# Patient Record
Sex: MALE | Race: WHITE | NOT HISPANIC OR LATINO | Employment: FULL TIME | URBAN - METROPOLITAN AREA
[De-identification: names, ages, dates, MRNs, and addresses within clinical notes are randomized per-mention and may not be internally consistent; named-entity substitution may affect disease eponyms.]

---

## 2022-06-06 ENCOUNTER — OFFICE VISIT (OUTPATIENT)
Dept: URGENT CARE | Facility: CLINIC | Age: 22
End: 2022-06-06
Payer: COMMERCIAL

## 2022-06-06 VITALS
HEART RATE: 69 BPM | OXYGEN SATURATION: 99 % | TEMPERATURE: 97 F | WEIGHT: 203 LBS | RESPIRATION RATE: 14 BRPM | HEIGHT: 69 IN | BODY MASS INDEX: 30.07 KG/M2

## 2022-06-06 DIAGNOSIS — B34.9 VIRAL SYNDROME: Primary | ICD-10-CM

## 2022-06-06 LAB
SARS-COV-2 AG UPPER RESP QL IA: NEGATIVE
VALID CONTROL: NORMAL

## 2022-06-06 PROCEDURE — 99213 OFFICE O/P EST LOW 20 MIN: CPT

## 2022-06-06 PROCEDURE — 87811 SARS-COV-2 COVID19 W/OPTIC: CPT

## 2022-06-06 NOTE — LETTER
June 6, 2022     Patient: Shagufta Trevizo   YOB: 2000   Date of Visit: 6/6/2022       To Whom it May Concern:    Shagufta Trevizo was seen in my clinic on 6/6/2022  He may return to work on 6/8/2022  If you have any questions or concerns, please don't hesitate to call           Sincerely,          FELIPE Calderon        CC: No Recipients

## 2022-06-06 NOTE — PATIENT INSTRUCTIONS
Your rapid COVID was negative  If your results are positive for COVID regardless of vaccination status, you are to isolate for 5 days from symptom onset then continue to wear a mask around others for another 5 days  If you have a fever, continue to stay home until your fever resolves  If you were exposed to someone with COVID and have been boosted or completed the primary series of Pfizer or Moderna vaccine within the last 6 months, or completed the primary series of J&J vaccine within the last 2 months - you are to wear a mask around others for 10 days and test on day 5 if possible  If you completed the primary series of Pfizer or Moderna vaccine over 6 months ago and are not boosted or completed the primary series of J&J over 2 months ago and are not boosted or are not vaccinated - you are to stay home for 5 days then continue to wear a mask around others for another 5 days  If you are unable to quarantine, you must wear a mask for 10 days  You should be fever free for 24 hours without the use of medication before returning to work  Tylenol and ibuprofen as needed for pain and/or fever  Mucinex DM for cough and congestion  Vitamin C 1000 mg, Vitamin D3 2000 units, and Zinc 100 mg help to boost your immunity to fight viruses  Hydrate - water and sports drinks (such as Gatorade, body armour, etc ) are great for hydration  Rest   Follow up with your PCP  Go to ED for worsening symptoms

## 2022-06-06 NOTE — PROGRESS NOTES
[facility]        NAME: Jordyn Anne is a 25 y o  male  : 2000    MRN: [id number]  DATE: 2022  TIME: 8:56 AM    Assessment and Plan   Viral syndrome [B34 9]  1  Viral syndrome  Poct Covid 19 Rapid Antigen Test     Rapid COVID negative  Patient Instructions     Your rapid COVID was negative  If your results are positive for COVID regardless of vaccination status, you are to isolate for 5 days from symptom onset then continue to wear a mask around others for another 5 days  If you have a fever, continue to stay home until your fever resolves  If you were exposed to someone with COVID and have been boosted or completed the primary series of Pfizer or Moderna vaccine within the last 6 months, or completed the primary series of J&J vaccine within the last 2 months - you are to wear a mask around others for 10 days and test on day 5 if possible  If you completed the primary series of Pfizer or Moderna vaccine over 6 months ago and are not boosted or completed the primary series of J&J over 2 months ago and are not boosted or are not vaccinated - you are to stay home for 5 days then continue to wear a mask around others for another 5 days  If you are unable to quarantine, you must wear a mask for 10 days  You should be fever free for 24 hours without the use of medication before returning to work  Tylenol and ibuprofen as needed for pain and/or fever  Mucinex DM for cough and congestion  Vitamin C 1000 mg, Vitamin D3 2000 units, and Zinc 100 mg help to boost your immunity to fight viruses  Hydrate - water and sports drinks (such as Gatorade, body armour, etc ) are great for hydration  Rest   Follow up with your PCP  Go to ED for worsening symptoms  Chief Complaint     Chief Complaint   Patient presents with    Cold Like Symptoms     C/o nasal congestion, cough, headache, chills, onset 2 days ago           History of Present Illness       Patient complaining of nasal congestion, sore throat, headache, dry cough, chills, fatigue, and fever that started 3 days ago  He denies trying alleviating factors  Patient denies significant PMH  Review of Systems   Review of Systems   Constitutional: Positive for fatigue and fever  Negative for chills  HENT: Positive for congestion and sore throat  Negative for ear pain and rhinorrhea  Respiratory: Positive for cough  Negative for shortness of breath  Cardiovascular: Negative for chest pain  Gastrointestinal: Negative for diarrhea, nausea and vomiting  Musculoskeletal: Negative for myalgias  Neurological: Positive for headaches  All other systems reviewed and are negative  Current Medications     No current outpatient medications on file  Current Allergies     Allergies as of 06/06/2022    (No Known Allergies)            The following portions of the patient's history were reviewed and updated as appropriate: allergies, current medications, past family history, past medical history, past social history, past surgical history and problem list      Past Medical History:   Diagnosis Date    Known health problems: none        Past Surgical History:   Procedure Laterality Date    NO PAST SURGERIES         Family History   Problem Relation Age of Onset    No Known Problems Mother     Diabetes Father          Medications have been verified  Objective   Pulse 69   Temp (!) 97 °F (36 1 °C)   Resp 14   Ht 5' 9" (1 753 m)   Wt 92 1 kg (203 lb)   SpO2 99%   BMI 29 98 kg/m²        Physical Exam     Physical Exam  Vitals and nursing note reviewed  Constitutional:       General: He is not in acute distress  Appearance: Normal appearance  He is not toxic-appearing  HENT:      Head: Normocephalic and atraumatic  Right Ear: External ear normal       Left Ear: External ear normal       Nose: Congestion present  No rhinorrhea  Right Turbinates: Swollen  Left Turbinates: Swollen  Right Sinus: No maxillary sinus tenderness or frontal sinus tenderness  Left Sinus: No maxillary sinus tenderness or frontal sinus tenderness  Mouth/Throat:      Mouth: Mucous membranes are moist       Pharynx: Oropharynx is clear  Posterior oropharyngeal erythema present  No oropharyngeal exudate  Eyes:      General: No scleral icterus  Right eye: No discharge  Left eye: No discharge  Conjunctiva/sclera: Conjunctivae normal    Cardiovascular:      Rate and Rhythm: Normal rate and regular rhythm  Heart sounds: Normal heart sounds  Pulmonary:      Effort: Pulmonary effort is normal       Breath sounds: Normal breath sounds  Abdominal:      Palpations: Abdomen is soft  Tenderness: There is no abdominal tenderness  Musculoskeletal:         General: Normal range of motion  Cervical back: Normal range of motion  Lymphadenopathy:      Cervical: No cervical adenopathy  Skin:     General: Skin is warm and dry  Neurological:      General: No focal deficit present  Mental Status: He is alert and oriented to person, place, and time     Psychiatric:         Mood and Affect: Mood normal          Behavior: Behavior normal

## 2022-10-03 ENCOUNTER — OFFICE VISIT (OUTPATIENT)
Dept: URGENT CARE | Facility: CLINIC | Age: 22
End: 2022-10-03
Payer: COMMERCIAL

## 2022-10-03 VITALS
DIASTOLIC BLOOD PRESSURE: 66 MMHG | HEIGHT: 69 IN | HEART RATE: 105 BPM | SYSTOLIC BLOOD PRESSURE: 134 MMHG | OXYGEN SATURATION: 99 % | TEMPERATURE: 97.9 F | BODY MASS INDEX: 30.07 KG/M2 | WEIGHT: 203 LBS | RESPIRATION RATE: 17 BRPM

## 2022-10-03 DIAGNOSIS — R68.89 FLU-LIKE SYMPTOMS: Primary | ICD-10-CM

## 2022-10-03 DIAGNOSIS — J02.9 ACUTE PHARYNGITIS, UNSPECIFIED ETIOLOGY: ICD-10-CM

## 2022-10-03 LAB
S PYO AG THROAT QL: NEGATIVE
SARS-COV-2 AG UPPER RESP QL IA: NEGATIVE
VALID CONTROL: NORMAL

## 2022-10-03 PROCEDURE — 87811 SARS-COV-2 COVID19 W/OPTIC: CPT | Performed by: PHYSICIAN ASSISTANT

## 2022-10-03 PROCEDURE — 99213 OFFICE O/P EST LOW 20 MIN: CPT | Performed by: PHYSICIAN ASSISTANT

## 2022-10-03 PROCEDURE — 87880 STREP A ASSAY W/OPTIC: CPT | Performed by: PHYSICIAN ASSISTANT

## 2022-10-03 PROCEDURE — 87070 CULTURE OTHR SPECIMN AEROBIC: CPT | Performed by: PHYSICIAN ASSISTANT

## 2022-10-03 RX ORDER — LIDOCAINE HYDROCHLORIDE 20 MG/ML
15 SOLUTION OROPHARYNGEAL 4 TIMES DAILY PRN
Qty: 100 ML | Refills: 0 | Status: SHIPPED | OUTPATIENT
Start: 2022-10-03

## 2022-10-03 RX ORDER — AMOXICILLIN 500 MG/1
500 CAPSULE ORAL EVERY 12 HOURS SCHEDULED
Qty: 20 CAPSULE | Refills: 0 | Status: SHIPPED | OUTPATIENT
Start: 2022-10-03 | End: 2022-10-13

## 2022-10-03 RX ORDER — PREDNISONE 10 MG/1
10 TABLET ORAL DAILY
Qty: 21 TABLET | Refills: 0 | Status: SHIPPED | OUTPATIENT
Start: 2022-10-03

## 2022-10-03 NOTE — PROGRESS NOTES
[facility]        NAME: Anna Mccrary is a 25 y o  male  : 2000    MRN: [id number]  DATE: October 3, 2022  TIME: 12:23 PM    Assessment and Plan   Flu-like symptoms [R68 89]  1  Flu-like symptoms  Poct Covid 19 Rapid Antigen Test    POCT rapid strepA    Throat culture   2  Acute pharyngitis, unspecified etiology  amoxicillin (AMOXIL) 500 mg capsule    predniSONE 10 mg tablet    Lidocaine Viscous HCl (XYLOCAINE) 2 % mucosal solution         Patient Instructions   Take antibiotic as prescribed  Complete full dose of antibiotics even if symptoms begin to improve or resolve  This is very contagious; do not share drinks or food with others  Replace your toothbrush in 1-2 days to prevent reinfection  Use OTC Tylenol for fever  Your symptoms should begin to improve over the next couple days  Follow up with PCP in 3-5 days  Proceed to  ER if symptoms worsen  Chief Complaint     Chief Complaint   Patient presents with    Nasal Congestion     Started with Sore throat  on Wednesday di a at home test that day He is having Pressure in head and cough Congestion          History of Present Illness       Patient is a 68-year-old male with no significant past medical history presents the office complaining of headache, congestion, rhinorrhea, sore throat, and cough for 6 days  States he did have a fever but that resolved  Reports his pain is rated 3-10 located his throat which is worse with coughing  He denies SOB, CP, nausea, vomiting, abdominal pain or rashes  Reports his symptoms are coming and going in severity  Review of Systems   Review of Systems   Constitutional: Positive for chills, fatigue and fever  HENT: Positive for congestion, postnasal drip, rhinorrhea and sore throat  Respiratory: Positive for cough  Negative for shortness of breath  Cardiovascular: Negative for chest pain and palpitations     Gastrointestinal: Negative for abdominal pain, diarrhea, nausea and vomiting  Musculoskeletal: Positive for myalgias  Skin: Negative for rash  Neurological: Positive for headaches  Negative for dizziness and light-headedness  Current Medications       Current Outpatient Medications:     amoxicillin (AMOXIL) 500 mg capsule, Take 1 capsule (500 mg total) by mouth every 12 (twelve) hours for 10 days, Disp: 20 capsule, Rfl: 0    Lidocaine Viscous HCl (XYLOCAINE) 2 % mucosal solution, Swish and spit 15 mL 4 (four) times a day as needed for mouth pain or discomfort, Disp: 100 mL, Rfl: 0    predniSONE 10 mg tablet, Take 1 tablet (10 mg total) by mouth daily Take 6 on day 1, take 5 on day 2, take 4 on day 3, take 3 on day 4, take 2 on day 5, take 1 on day 6 , Disp: 21 tablet, Rfl: 0    Current Allergies     Allergies as of 10/03/2022    (No Known Allergies)            The following portions of the patient's history were reviewed and updated as appropriate: allergies, current medications, past family history, past medical history, past social history, past surgical history and problem list      Past Medical History:   Diagnosis Date    Known health problems: none        Past Surgical History:   Procedure Laterality Date    NO PAST SURGERIES         Family History   Problem Relation Age of Onset    No Known Problems Mother     Diabetes Father          Medications have been verified  Objective   /66   Pulse 105   Temp 97 9 °F (36 6 °C)   Resp 17   Ht 5' 9" (1 753 m)   Wt 92 1 kg (203 lb)   SpO2 99%   BMI 29 98 kg/m²   No LMP for male patient  Physical Exam     Physical Exam  Vitals and nursing note reviewed  Constitutional:       Appearance: Normal appearance  He is well-developed  HENT:      Head: Normocephalic and atraumatic  Right Ear: Tympanic membrane, ear canal and external ear normal       Left Ear: Tympanic membrane, ear canal and external ear normal       Nose: Congestion and rhinorrhea present        Mouth/Throat:      Mouth: Mucous membranes are moist       Pharynx: Oropharynx is clear  Uvula midline  Posterior oropharyngeal erythema present  No pharyngeal swelling  Tonsils: Tonsillar exudate present  No tonsillar abscesses  Eyes:      General: Lids are normal       Conjunctiva/sclera: Conjunctivae normal       Pupils: Pupils are equal, round, and reactive to light  Cardiovascular:      Rate and Rhythm: Normal rate and regular rhythm  Heart sounds: Normal heart sounds  No murmur heard  No friction rub  No gallop  Pulmonary:      Effort: Pulmonary effort is normal       Breath sounds: Normal breath sounds  No stridor  No wheezing or rales  Musculoskeletal:         General: Normal range of motion  Cervical back: Neck supple  Lymphadenopathy:      Cervical: Cervical adenopathy present  Skin:     General: Skin is warm and dry  Capillary Refill: Capillary refill takes less than 2 seconds  Neurological:      Mental Status: He is alert         POC rapid COVID-19 negative    POC rapid strep negative

## 2022-10-03 NOTE — LETTER
October 3, 2022     Patient: Rasheeda Wong   YOB: 2000   Date of Visit: 10/3/2022       To Whom It May Concern: It is my medical opinion that Yo Vo should remain out of work until 10/5/2022             Sincerely,        Lala Cheung PA-C

## 2022-10-05 LAB — BACTERIA THROAT CULT: NORMAL

## 2023-08-31 ENCOUNTER — OFFICE VISIT (OUTPATIENT)
Dept: URGENT CARE | Facility: CLINIC | Age: 23
End: 2023-08-31
Payer: COMMERCIAL

## 2023-08-31 VITALS
OXYGEN SATURATION: 97 % | BODY MASS INDEX: 30.36 KG/M2 | HEIGHT: 69 IN | RESPIRATION RATE: 18 BRPM | HEART RATE: 83 BPM | TEMPERATURE: 97.7 F | DIASTOLIC BLOOD PRESSURE: 71 MMHG | SYSTOLIC BLOOD PRESSURE: 137 MMHG | WEIGHT: 205 LBS

## 2023-08-31 DIAGNOSIS — J02.9 VIRAL PHARYNGITIS: Primary | ICD-10-CM

## 2023-08-31 LAB — S PYO AG THROAT QL: NEGATIVE

## 2023-08-31 PROCEDURE — 87147 CULTURE TYPE IMMUNOLOGIC: CPT | Performed by: PHYSICIAN ASSISTANT

## 2023-08-31 PROCEDURE — 99213 OFFICE O/P EST LOW 20 MIN: CPT | Performed by: PHYSICIAN ASSISTANT

## 2023-08-31 PROCEDURE — 87070 CULTURE OTHR SPECIMN AEROBIC: CPT | Performed by: PHYSICIAN ASSISTANT

## 2023-08-31 NOTE — LETTER
August 31, 2023     Patient: Pierce Lui   YOB: 2000   Date of Visit: 8/31/2023       To Whom It May Concern:     It is my medical opinion that Emma Guzman may return to work on 9/1/2023             Sincerely,        Brennon Briscoe PA-C

## 2023-08-31 NOTE — PROGRESS NOTES
[facility]        NAME: Jessika Luciano is a 23 y.o. male  : 2000    MRN: [id number]  DATE: 2023  TIME: 1:14 PM    Assessment and Plan   Viral pharyngitis [J02.9]  1. Viral pharyngitis  POCT rapid strepA    Throat culture            Patient Instructions   Drink plenty of fluids. May use over the counter cold medications for symptomatic treatment. Do not use medications with Pseudoephedrine or Phenylphrine if you have high blood pressure because it may worsen your blood pressure. Follow up with your PCP in 3-5 days if your symptoms do not improve or if you have any concerns. Go to the ER if symptoms become severe. Follow up with PCP in 3-5 days. Proceed to  ER if symptoms worsen. Chief Complaint     Chief Complaint   Patient presents with   • URI     Fatigue, sub f/c, sore throat x 4 days         History of Present Illness       Patient is a 45-year-old male with no sniffing a past medical history presents the office planing of fatigue, subjective fevers and chills, sore throat for 4 days. Pain is rated 3 out of 10. Reports symptoms are mild in nature other than fatigue. Denies congestion, cough, abdominal symptoms, or rashes. He needs a note to be excused from work. Review of Systems   Review of Systems   Constitutional: Positive for chills and fatigue. Negative for fever. HENT: Negative for congestion and sore throat. Respiratory: Negative for cough and shortness of breath. Cardiovascular: Negative for chest pain and palpitations. Gastrointestinal: Negative for abdominal pain, diarrhea, nausea and vomiting. Musculoskeletal: Positive for myalgias. Skin: Negative for rash. Neurological: Negative for dizziness, light-headedness and headaches. Current Medications     No current outpatient medications on file.     Current Allergies     Allergies as of 2023   • (No Known Allergies)            The following portions of the patient's history were reviewed and updated as appropriate: allergies, current medications, past family history, past medical history, past social history, past surgical history and problem list.     Past Medical History:   Diagnosis Date   • Known health problems: none        Past Surgical History:   Procedure Laterality Date   • NO PAST SURGERIES         Family History   Problem Relation Age of Onset   • No Known Problems Mother    • Diabetes Father          Medications have been verified. Objective   /71   Pulse 83   Temp 97.7 °F (36.5 °C)   Resp 18   Ht 5' 9" (1.753 m)   Wt 93 kg (205 lb)   SpO2 97%   BMI 30.27 kg/m²   No LMP for male patient. Physical Exam     Physical Exam  Vitals and nursing note reviewed. Constitutional:       Appearance: Normal appearance. He is well-developed. HENT:      Head: Normocephalic and atraumatic. Right Ear: Tympanic membrane, ear canal and external ear normal.      Left Ear: Tympanic membrane, ear canal and external ear normal.      Nose: Nose normal.      Mouth/Throat:      Mouth: Mucous membranes are moist.      Pharynx: Uvula midline. Posterior oropharyngeal erythema present. No pharyngeal swelling. Tonsils: No tonsillar exudate or tonsillar abscesses. Eyes:      General: Lids are normal.      Conjunctiva/sclera: Conjunctivae normal.      Pupils: Pupils are equal, round, and reactive to light. Cardiovascular:      Rate and Rhythm: Normal rate and regular rhythm. Heart sounds: Normal heart sounds. No murmur heard. No friction rub. No gallop. Pulmonary:      Effort: Pulmonary effort is normal.      Breath sounds: Normal breath sounds. No stridor. No wheezing or rales. Musculoskeletal:         General: Normal range of motion. Cervical back: Neck supple. Skin:     General: Skin is warm and dry. Capillary Refill: Capillary refill takes less than 2 seconds. Neurological:      Mental Status: He is alert.        POC rapid strep negative

## 2023-09-03 LAB — BACTERIA THROAT CULT: ABNORMAL

## 2025-01-20 ENCOUNTER — OFFICE VISIT (OUTPATIENT)
Dept: URGENT CARE | Facility: CLINIC | Age: 25
End: 2025-01-20
Payer: COMMERCIAL

## 2025-01-20 VITALS
WEIGHT: 216 LBS | HEIGHT: 69 IN | RESPIRATION RATE: 16 BRPM | DIASTOLIC BLOOD PRESSURE: 77 MMHG | SYSTOLIC BLOOD PRESSURE: 123 MMHG | BODY MASS INDEX: 31.99 KG/M2 | HEART RATE: 83 BPM | TEMPERATURE: 96.4 F | OXYGEN SATURATION: 96 %

## 2025-01-20 DIAGNOSIS — J06.9 UPPER RESPIRATORY TRACT INFECTION, UNSPECIFIED TYPE: Primary | ICD-10-CM

## 2025-01-20 PROCEDURE — G0382 LEV 3 HOSP TYPE B ED VISIT: HCPCS

## 2025-01-20 PROCEDURE — S9083 URGENT CARE CENTER GLOBAL: HCPCS

## 2025-01-20 NOTE — LETTER
January 20, 2025     Patient: Quentin Maza   YOB: 2000   Date of Visit: 1/20/2025       To Whom it May Concern:    Quentin Maza was seen in my clinic on 1/20/2025. He was seen for an illness that began Wednesday night on 1/15/25 that left you out of work the rest of the week. He may return to work today 1/20/25.    If you have any questions or concerns, please don't hesitate to call.         Sincerely,          Mina Penn PA-C        CC: No Recipients

## 2025-01-20 NOTE — PROGRESS NOTES
[facility]        NAME: Quentin Maza is a 24 y.o. male  : 2000    MRN: [id number]  DATE: 2025  TIME: 3:24 PM    Assessment and Plan   Upper respiratory tract infection, unspecified type [J06.9]  1. Upper respiratory tract infection, unspecified type          Patient declined any need for further treatment of symptoms given most of his symptoms have resolved.  Recommended patient still drink plenty of fluids.  Patient expressed understanding.  Patient asked for a note stating he was seen and treated here today and he is able to go return to work.  Patient asked if the paperwork could include the 3 days he was off last week.  Explained to patient we are unable to complete any backtracking of days off in regards to work notes at this office.  However explained inpatient note to employer that he was seen for an illness that began on Wednesday and patient said that that was satisfactory.  I explained that this may not completely excuse you and you might need proper paperwork to your primary provider and patient expressed understanding.    Patient Instructions     Patient Instructions   Cold symptoms may last for a total of 1-2 weeks. Symptoms typically start with a sore throat and progress to include sinus pain/pressure, runny nose (yellow/green), and congestion/cough. The yellow/green color does not necessarily indicate a bacterial sinus infection. If your sinus symptoms worsen on day 10-14, you may want to consider re-evaluation for a possible secondary bacterial sinus infection. Coughs are typically most bothersome the first 1-2 weeks. Coughs frequently linger for 4-6 weeks. However, have your lungs re-evaluated if you develop sudden worsening cough, shortness or breath or chest discomfort.     Medication as prescribed    Vitamin D3 2000 IU daily  Vitamin C 1000mg twice per day  Some studies suggest that Zinc 12.5-15mg every 2 hours while awake x 5 days may shorten the duration cold  symptoms by 1-2 days.   Fluids and rest  Nasal saline spray (Extra Strength) 3 drops in each nostril 4x per day may shorten the duration of illness by 1-2 days and help prevent spread.  Afrin if severe congestion (do not use for more than 3 days)  Over the counter cold medication as needed (EX: Mucinex DM, Sudafed I.e. pseudoephedrine, tylenol)  Sinus Rinses (used distilled water only and carefully follow instructions)  Salt water gargles and chloraseptic spray  Throat Coat Tea (herbal licorice root tea for sore throat-add honey unless diabetic)  Warm compresses over sinuses  Steam treatment (utilize proper safety precautions when in contact with hot water/steam)        Follow up with PCP in 3-5 days.  Proceed to  ER if symptoms worsen.    Chief Complaint     Chief Complaint   Patient presents with    Cold Like Symptoms     Sore throat and cough starting 5 days ago.          History of Present Illness       25-year-old male presenting with cold-like symptoms x 5 days.  Patient reports that on Wednesday last week he began to develop cold-like symptoms including chills, fevers, body aches, cough and congestion.  Patient states that by Saturday and Sunday began to see significant improvement in symptoms.  Patient reports that aside for some mild fatigue he is almost back to baseline today in office.  Patient states he is mainly here for a work note so he can return today.        Review of Systems   Review of Systems   Constitutional:  Positive for fatigue. Negative for chills and fever.   HENT:  Negative for congestion, ear pain, rhinorrhea and sore throat.    Respiratory:  Negative for cough and shortness of breath.    Cardiovascular:  Negative for chest pain and palpitations.   Gastrointestinal:  Negative for abdominal pain, diarrhea, nausea and vomiting.   Musculoskeletal:  Negative for arthralgias and myalgias.   Neurological:  Negative for light-headedness and headaches.         Current Medications     No current  "outpatient medications on file.    Current Allergies     Allergies as of 01/20/2025    (No Known Allergies)            The following portions of the patient's history were reviewed and updated as appropriate: allergies, current medications, past family history, past medical history, past social history, past surgical history and problem list.     Past Medical History:   Diagnosis Date    Known health problems: none        Past Surgical History:   Procedure Laterality Date    NO PAST SURGERIES         Family History   Problem Relation Age of Onset    No Known Problems Mother     Diabetes Father          Medications have been verified.        Objective   /77   Pulse 83   Temp (!) 96.4 °F (35.8 °C)   Resp 16   Ht 5' 9\" (1.753 m)   Wt 98 kg (216 lb)   SpO2 96%   BMI 31.90 kg/m²        Physical Exam     Physical Exam  Vitals and nursing note reviewed.   Constitutional:       General: He is not in acute distress.     Appearance: He is normal weight. He is not ill-appearing.   HENT:      Head: Normocephalic and atraumatic.      Right Ear: Tympanic membrane, ear canal and external ear normal.      Left Ear: Tympanic membrane, ear canal and external ear normal.      Nose: Nose normal. No congestion.      Mouth/Throat:      Mouth: Mucous membranes are moist.      Pharynx: Oropharynx is clear. No posterior oropharyngeal erythema.   Eyes:      Conjunctiva/sclera: Conjunctivae normal.      Pupils: Pupils are equal, round, and reactive to light.   Cardiovascular:      Rate and Rhythm: Normal rate and regular rhythm.      Pulses: Normal pulses.      Heart sounds: Normal heart sounds.   Pulmonary:      Effort: Pulmonary effort is normal.      Breath sounds: Normal breath sounds. No wheezing.   Abdominal:      General: Abdomen is flat. Bowel sounds are normal.      Palpations: Abdomen is soft.      Tenderness: There is no abdominal tenderness.   Lymphadenopathy:      Cervical: No cervical adenopathy.   Skin:     " General: Skin is warm and dry.      Capillary Refill: Capillary refill takes less than 2 seconds.   Neurological:      General: No focal deficit present.      Mental Status: He is alert and oriented to person, place, and time.   Psychiatric:         Mood and Affect: Mood normal.         Behavior: Behavior normal.